# Patient Record
(demographics unavailable — no encounter records)

---

## 2025-07-07 NOTE — PHYSICAL EXAM
[Alert] : alert [Normal Voice/Communication] : normal voice/communication [No Acute Distress] : no acute distress [Well Developed] : well developed [Well Nourished] : well nourished [Sclera] : the sclera and conjunctiva were normal [Hearing Threshold Finger Rub Not [place]] : hearing was normal [Normal Appearance] : the appearance of the neck was normal [No Respiratory Distress] : no respiratory distress [No Acc Muscle Use] : no accessory muscle use [Respiration, Rhythm And Depth] : normal respiratory rhythm and effort [Abnormal Walk] : normal gait [Normal Color / Pigmentation] : normal skin color and pigmentation [Oriented To Time, Place, And Person] : oriented to person, place, and time [Auscultation Breath Sounds / Voice Sounds] : lungs were clear to auscultation bilaterally [Heart Rate And Rhythm] : heart rate was normal and rhythm regular [Normal S1, S2] : normal S1 and S2 [Bowel Sounds] : normal bowel sounds [Abdomen Tenderness] : non-tender [Abdomen Soft] : soft

## 2025-07-07 NOTE — HISTORY OF PRESENT ILLNESS
[FreeTextEntry1] : 51 yr old female with H/O Endometrial [place] [facility] [place], Gout, H/O Sapna-en-Y 2019, Hiatal hernia repair in 2022, done at [place], Dysphagia, presents here for further evaluation of dysphagia, GERD with esophageal dysmotility with ?achalasia.   She C/O worsening acid reflux and dysphagia. She also notes an unintentional weight loss of 5 lbs recently. She takes pantoprazole in am and famotidine at hs without improvement in the symptoms. No vomiting, odynophagia, or blood in the stool. She also reported a long H/O IBS and has had BMs q day but usually very small amounts. She is not taking laxatives on a regular basis.   Esophagram and CT Abdomen done recently at [place], reports pending   Esophageal Manometry done recently at [place]

## 2025-07-07 NOTE — ASSESSMENT
[FreeTextEntry1] : 51 yr old female with H/O Endometrial and thyroid cancer [place] [facility] [place] Asthma, Gout, H/O Sapna-en-Y 2019, Hiatal hernia repair in 2022, done at [place], Dysphagia, presents here for further evaluation of dysphagia, GERD with esophageal dysmotility with ?achalasia.    GERD Dysphagia with esophageal dysmotility,? Achalasia - Need to get the reports of the manometry, esophagram (need the CD of the esophagram) - Will schedule an EGD; risks vs benefits discussed  - start voquezna 20 mg OD     IBS-C - She was told to take miralax OD tot id and titrate dose as indicated with a goal of at least 1 full BM per day - Dicyclomine 10 mg q 8h prn abdominal cramping.  F/U after EGD is done and when I have the manometry and esophagram CD

## 2025-07-07 NOTE — HISTORY OF PRESENT ILLNESS
[FreeTextEntry1] : 51 yr old female with H/O Endometrial and thyroid cancer [place] [facility] [place] Asthma, Gout, H/O Sapna-en-Y 2019, Hiatal hernia repair in 2022, done at [place], Dysphagia, presents here for further evaluation of dysphagia, GERD with esophageal dysmotility with ?achalasia.   She C/O worsening acid reflux and dysphagia. She also notes an unintentional weight loss of 5 lbs recently. She takes pantoprazole in am and famotidine at hs without improvement in the symptoms. No vomiting, odynophagia, or blood in the stool. She also reported a long H/O IBS and has had BMs q day but usually very small amounts. She is not taking laxatives on a regular basis.   Esophagram and CT Abdomen done recently at [place], reports pending   Esophageal Manometry done recently at [place]

## 2025-07-07 NOTE — PHYSICAL EXAM
[Alert] : alert [Normal Voice/Communication] : normal voice/communication [No Acute Distress] : no acute distress [Well Developed] : well developed [Well Nourished] : well nourished [Sclera] : the sclera and conjunctiva were normal [Hearing Threshold Finger Rub Not [place]] : hearing was normal [Normal Appearance] : the appearance of the neck was normal [No Respiratory Distress] : no respiratory distress [No Acc Muscle Use] : no accessory muscle use [Respiration, Rhythm And Depth] : normal respiratory rhythm and effort [Abnormal Walk] : normal gait [Normal Color / Pigmentation] : normal skin color and pigmentation [Oriented To Time, Place, And Person] : oriented to person, place, and time [Auscultation Breath Sounds / Voice Sounds] : lungs were clear to auscultation bilaterally [Heart Rate And Rhythm] : heart rate was normal and rhythm regular [Normal S1, S2] : normal S1 and S2 [Bowel Sounds] : normal bowel sounds [Abdomen Soft] : soft [Abdomen Tenderness] : non-tender

## 2025-07-07 NOTE — ASSESSMENT
[FreeTextEntry1] : 51 yr old female with H/O Endometrial and thyroid cancer ([place] [facility] [place] Asthma, Gout, H/O Sapna-en-Y 2019, Hiatal hernia repair in 2022, done at [place], Dysphagia, presents here for further evaluation of dysphagia, GERD with esophageal dysmotility with ?achalasia.    GERD Dysphagia with esophageal dysmotility,? Achalasia - Need to get the reports of the manometry, esophagram (need the CD of the esophagram) - Will schedule an EGD; risks vs benefits discussed  - start voquezna 20 mg OD     IBS-C - She was told to take miralax OD tot id and titrate dose as indicated with a goal of at least 1 full BM per day - Dicyclomine 10 mg q 8h prn abdominal cramping.  F/U after EGD is done and when I have the manometry and esophagram CD

## 2025-07-23 NOTE — PHYSICAL EXAM
[Alert] : alert [Normal Voice/Communication] : normal voice/communication [Healthy Appearing] : healthy appearing [No Acute Distress] : no acute distress [Sclera] : the sclera and conjunctiva were normal [Hearing Threshold Finger Rub Not [place]] : hearing was normal [Normal Lips/Gums] : the lips and gums were normal [Oropharynx] : the oropharynx was normal [Normal Appearance] : the appearance of the neck was normal [No Neck Mass] : no neck mass was observed [No Respiratory Distress] : no respiratory distress [No Acc Muscle Use] : no accessory muscle use [Respiration, Rhythm And Depth] : normal respiratory rhythm and effort [Abdomen Tenderness] : non-tender [Abdomen Soft] : soft [Abnormal Walk] : normal gait [Normal Color / Pigmentation] : normal skin color and pigmentation [Oriented To Time, Place, And Person] : oriented to person, place, and time

## 2025-07-23 NOTE — PHYSICAL EXAM
[Alert] : alert [Normal Voice/Communication] : normal voice/communication [Healthy Appearing] : healthy appearing [No Acute Distress] : no acute distress [Sclera] : the sclera and conjunctiva were normal [Hearing Threshold Finger Rub Not [place]] : hearing was normal [Normal Lips/Gums] : the lips and gums were normal [Oropharynx] : the oropharynx was normal [Normal Appearance] : the appearance of the neck was normal [No Neck Mass] : no neck mass was observed [No Respiratory Distress] : no respiratory distress [No Acc Muscle Use] : no accessory muscle use [Respiration, Rhythm And Depth] : normal respiratory rhythm and effort [Abdomen Tenderness] : non-tender [Abdomen Soft] : soft [Abnormal Walk] : normal gait [Normal Color / Pigmentation] : normal skin color and pigmentation [Oriented To Time, Place, And Person] : oriented to person, place, and time

## 2025-07-30 NOTE — HISTORY OF PRESENT ILLNESS
[FreeTextEntry1] : 51 yr old female with H/O Endometrial and thyroid cancer [place] [facility] [place] Asthma, Gout, H/O Sapna-en-Y 2019, Hiatal hernia repair in 2022, done at [place], Dysphagia, presents here for further evaluation of dysphagia, GERD with esophageal dysmotility with ?achalasia, presents post EGD.   She C/O worsening acid reflux and dysphagia. She has been taking voquezna 20 mg q day with improvement in the reflux but still with intermittent dysphagia. She is also taking omeprazole 40 mg OD for the ulcer at the prior Sapna-en-Y suture site. Abd pain better with dicyclomine. No vomiting, odynophagia, weight loss, or blood in the stool. She also reported a long H/O IBS and has had BMs q day but usually very small amounts. She has been taking miralax q day with improvement in the constipation.  Impressions: Esophagus appeared spastic. A large amount of mucus was seen in the hypopharynx near the pyriform sinus likely post nasal drip. Erosions in the stomach compatible with erosive gastritis. (Biopsy). Prior evidence of Sapna-en Y was seen with a clean based ulcer near the suture line. The visualized portion of the jejunum appeared normal. Biopsies were negative for HP, IM, and Dysplasia.      Esophagram and CT Abdomen done recently at [place], reports pending  Esophageal Manometry done recently at [place]  [de-identified] : 7/11/25

## 2025-07-30 NOTE — ASSESSMENT
[FreeTextEntry1] : 51 yr old female with H/[place]ometrial and [place] [facility] [place], Gout, H/O Aspna-en-Y 2019, Hiatal hernia repair in 2022, done at [place], Dysphagia, presents here for further evaluation of dysphagia, GERD with esophageal dysmotility with ?achalasia, presents post EGD.     GERD Dysphagia with esophageal spasms, R/O Achalasia  - Need to get the reports of the manometry, esophagram (need the CD of the esophagram) - EGD results discussed  - Start levsin SL OD prn/esophageal spasms - Will repeat esophagram - Continue  voquezna 20 mg OD - Will schedule an EGD with Botox for October; the plan may change once I review the manometry report   IBS-C - She was told to take miralax OD tot id and titrate dose as indicated with a goal of at least 1 full BM per day - Dicyclomine 10 mg q 8h prn abdominal cramping.  Will call patient after esophagram is done and after have reviewed the manometry report

## 2025-07-30 NOTE — HISTORY OF PRESENT ILLNESS
[FreeTextEntry1] : 51 yr old female with H/O Endometrial and thyroid cancer [place] [facility] [place] Asthma, Gout, H/O Sapna-en-Y 2019, Hiatal hernia repair in 2022, done at [place], Dysphagia, presents here for further evaluation of dysphagia, GERD with esophageal dysmotility with ?achalasia, presents post EGD.   She C/O worsening acid reflux and dysphagia. She has been taking voquezna 20 mg q day with improvement in the reflux but still with intermittent dysphagia. She is also taking omeprazole 40 mg OD for the ulcer at the prior Sapna-en-Y suture site. Abd pain better with dicyclomine. No vomiting, odynophagia, weight loss, or blood in the stool. She also reported a long H/O IBS and has had BMs q day but usually very small amounts. She has been taking miralax q day with improvement in the constipation.  Impressions: Esophagus appeared spastic. A large amount of mucus was seen in the hypopharynx near the pyriform sinus likely post nasal drip. Erosions in the stomach compatible with erosive gastritis. (Biopsy). Prior evidence of Sapna-en Y was seen with a clean based ulcer near the suture line. The visualized portion of the jejunum appeared normal. Biopsies were negative for HP, IM, and Dysplasia.      Esophagram and CT Abdomen done recently at [place], reports pending  Esophageal Manometry done recently at [place]  [de-identified] : 7/11/25

## 2025-07-30 NOTE — ASSESSMENT
[FreeTextEntry1] : 51 yr old female with H/O Endometrial and thyroid cancer [place] [facility] [place]hma, Gout, H/O Sapna-en-Y 2019, Hiatal hernia repair in 2022, done at [place], Dysphagia, presents here for further evaluation of dysphagia, GERD with esophageal dysmotility with ?achalasia, presents post EGD.     GERD Dysphagia with esophageal spasms, R/O Achalasia  - Need to get the reports of the manometry, esophagram (need the CD of the esophagram) - EGD results discussed  - Start levsin SL OD prn/esophageal spasms - Will repeat esophagram - Continue  voquezna 20 mg OD - Will schedule an EGD with Botox for October; the plan may change once I review the manometry report   IBS-C - She was told to take miralax OD tot id and titrate dose as indicated with a goal of at least 1 full BM per day - Dicyclomine 10 mg q 8h prn abdominal cramping.  Will call patient after esophagram is done and after have reviewed the manometry report